# Patient Record
(demographics unavailable — no encounter records)

---

## 2025-05-20 NOTE — PHYSICAL EXAM
[MA] : MA [FreeTextEntry1] : Sofia Pedraza [Appropriately responsive] : appropriately responsive [Alert] : alert [No Acute Distress] : no acute distress [No Lymphadenopathy] : no lymphadenopathy [Soft] : soft [Non-tender] : non-tender [Non-distended] : non-distended [No HSM] : No HSM [No Lesions] : no lesions [No Mass] : no mass [Oriented x3] : oriented x3 [Examination Of The Breasts] : a normal appearance [No Masses] : no breast masses were palpable [Labia Majora] : normal [Labia Minora] : normal [Normal] : normal [Uterine Adnexae] : normal

## 2025-05-20 NOTE — SIGNATURES
[TextEntry] : This note was written by Cindy Garsia on 05/20/2025 actively solely Lisseth Reich M.D.   All medical record entries made by this scribe at my, Lisseth Reich M.D direction and personally dictated by me on 05/20/2025. I have personally reviewed the chart and agree that the record reflects my personal performance of the history, physical exam, assessment, and plan.

## 2025-05-20 NOTE — HISTORY OF PRESENT ILLNESS
[FreeTextEntry1] : 60 year old P2 LMP menopause presents for annual gyn visit. Pt feels well and has no complaints. She denies vaginal bleeding, itching, burning, or abnormal discharge. Reports 56 year old sister was diagnosed with breast cancer. No other gyn concerns.